# Patient Record
Sex: FEMALE | Race: WHITE
[De-identification: names, ages, dates, MRNs, and addresses within clinical notes are randomized per-mention and may not be internally consistent; named-entity substitution may affect disease eponyms.]

---

## 2020-03-20 ENCOUNTER — HOSPITAL ENCOUNTER (EMERGENCY)
Dept: HOSPITAL 5 - ED | Age: 65
Discharge: HOME | End: 2020-03-20
Payer: MEDICARE

## 2020-03-20 VITALS — DIASTOLIC BLOOD PRESSURE: 133 MMHG | SYSTOLIC BLOOD PRESSURE: 216 MMHG

## 2020-03-20 DIAGNOSIS — S61.211A: Primary | ICD-10-CM
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
## 2020-03-20 PROCEDURE — 90471 IMMUNIZATION ADMIN: CPT

## 2020-03-20 PROCEDURE — 90715 TDAP VACCINE 7 YRS/> IM: CPT

## 2020-03-20 PROCEDURE — 99282 EMERGENCY DEPT VISIT SF MDM: CPT

## 2020-03-20 NOTE — EMERGENCY DEPARTMENT REPORT
ED Laceration HPI





- HPI


Chief Complaint: Extremity Injury, Upper


Stated Complaint: FINGER CUT


Time Seen by Provider: 03/20/20 10:31


Occurred When: Today


Location: Upper Extremity


Severity: mild


Tetanus Status: Not up to Date


Laceration Symptoms: Yes Pain, No Foreign Body Sensation, No Numbness, No 

Weakness


Other History: This is a 65-year-old female who presents the ED complaining of 

left index finger laceration sustained at work around 4 AM this morning.  

Patient states she was moving a metal table and the metal part of the table 

accidentally nicked her posterior finger.  Patient states that she was able to 

put a Band-Aid over it and stopped the bleeding.  Patient states her tetanus is 

not up-to-date





ED Review of Systems


ROS: 


Stated complaint: FINGER CUT


Other details as noted in HPI





Comment: All other systems reviewed and negative





ED Past Medical Hx





- Past Medical History


Previous Medical History?: No


Hx Hypertension: Yes





- Surgical History


Past Surgical History?: No





- Medications


Home Medications: 


                                Home Medications











 Medication  Instructions  Recorded  Confirmed  Last Taken  Type


 


Ibuprofen [Motrin] 800 mg PO Q8HR #30 tablet 03/20/20  Unknown Rx


 


cephALEXin [Keflex] 500 mg PO Q12HR #10 cap 03/20/20  Unknown Rx














Laceration Physical Exam





- Exam


General: 


Vital signs noted. No distress. Alert and acting appropriately.





Wound Length (cm): 1


Laceration Location: Upper Extremity


Laceration Exam: Yes Normal Distal CMS, No Foreign Body, No Exposed Tendon, 

Vessel, or Nerve, No Tendon Injury





ED Course


                                   Vital Signs











  03/20/20





  09:09


 


Temperature 98.5 F


 


Pulse Rate 65


 


Respiratory 16





Rate 


 


Blood Pressure 216/133


 


O2 Sat by Pulse 97





Oximetry 














- Laceration /Wound Repair


  ** Left Posterior Finger


Wound Location: upper extremity (Left index finger)


Wound Length (cm): 1


Wound's Depth, Shape: superficial, linear


Wound Explored: clean


Irrigated w/ Saline (ccs): 200


Betadine Prep?: No


Wound Repaired With: Steri-strips, Dermabond


Number of Sutures: 0


Layer Closure?: No


Sterile Dressing Applied?: Yes





ED Medical Decision Making





- Medical Decision Making





55-year-old female presents with finger laceration.


Wound was cleaned, asked Dermabond and Steri-Strip applied to wound as wound was

 minimal.


Patient tolerated procedure well.


Patient received tetanus booster in the ED.


Discussed Motrin as needed for pain.


Vital signs are normal patient is in no acute distress.


Discussed follow-up with primary care physician in 3 to 5 days.





Critical care attestation.: 


If time is entered above; I have spent that time in minutes in the direct care 

of this critically ill patient, excluding procedure time.








ED Disposition


Clinical Impression: 


 Laceration of finger of left hand





Disposition: DC-01 TO HOME OR SELFCARE


Is pt being admited?: No


Does the pt Need Aspirin: No


Condition: Stable


Instructions:  Finger Laceration (ED), Skin Adhesive Care (ED)


Additional Instructions: 


Make sure to follow up with the primary care physician as discussed.


Take all your medications as you've been prescribed.


If you have any worsening symptoms or develop new symptoms please return to ED 

immediately.


Prescriptions: 


cephALEXin [Keflex] 500 mg PO Q12HR #10 cap


Ibuprofen [Motrin] 800 mg PO Q8HR #30 tablet


Referrals: 


ARABELLA JARVIS MD [Primary Care Provider] - 3-5 Days


Forms:  Work/School Release Form(ED)


Time of Disposition: 10:47